# Patient Record
Sex: FEMALE | Race: WHITE
[De-identification: names, ages, dates, MRNs, and addresses within clinical notes are randomized per-mention and may not be internally consistent; named-entity substitution may affect disease eponyms.]

---

## 2019-04-05 ENCOUNTER — HOSPITAL ENCOUNTER (INPATIENT)
Dept: HOSPITAL 95 - ER | Age: 49
LOS: 2 days | Discharge: HOME | DRG: 392 | End: 2019-04-07
Attending: SURGERY | Admitting: SURGERY
Payer: COMMERCIAL

## 2019-04-05 VITALS — HEIGHT: 67.99 IN | WEIGHT: 134.92 LBS | BODY MASS INDEX: 20.45 KG/M2

## 2019-04-05 DIAGNOSIS — F41.9: ICD-10-CM

## 2019-04-05 DIAGNOSIS — K63.5: ICD-10-CM

## 2019-04-05 DIAGNOSIS — K21.9: ICD-10-CM

## 2019-04-05 DIAGNOSIS — K63.89: ICD-10-CM

## 2019-04-05 DIAGNOSIS — Z79.899: ICD-10-CM

## 2019-04-05 DIAGNOSIS — K64.8: ICD-10-CM

## 2019-04-05 DIAGNOSIS — K59.09: Primary | ICD-10-CM

## 2019-04-05 DIAGNOSIS — Z88.5: ICD-10-CM

## 2019-04-05 LAB
ALBUMIN SERPL BCP-MCNC: 3.8 G/DL (ref 3.4–5)
ALBUMIN/GLOB SERPL: 1.3 {RATIO} (ref 0.8–1.8)
ALT SERPL W P-5'-P-CCNC: 31 U/L (ref 12–78)
ANION GAP SERPL CALCULATED.4IONS-SCNC: 3 MMOL/L (ref 6–16)
AST SERPL W P-5'-P-CCNC: 25 U/L (ref 12–37)
BASOPHILS # BLD AUTO: 0.05 K/MM3 (ref 0–0.23)
BASOPHILS NFR BLD AUTO: 1 % (ref 0–2)
BILIRUB SERPL-MCNC: 0.4 MG/DL (ref 0.1–1)
BUN SERPL-MCNC: 11 MG/DL (ref 8–24)
CALCIUM SERPL-MCNC: 8.5 MG/DL (ref 8.5–10.1)
CHLORIDE SERPL-SCNC: 105 MMOL/L (ref 98–108)
CO2 SERPL-SCNC: 29 MMOL/L (ref 21–32)
CREAT SERPL-MCNC: 0.68 MG/DL (ref 0.4–1)
DEPRECATED RDW RBC AUTO: 41.8 FL (ref 35.1–46.3)
EOSINOPHIL # BLD AUTO: 0.07 K/MM3 (ref 0–0.68)
EOSINOPHIL NFR BLD AUTO: 1 % (ref 0–6)
ERYTHROCYTE [DISTWIDTH] IN BLOOD BY AUTOMATED COUNT: 12.2 % (ref 11.7–14.2)
GLOBULIN SER CALC-MCNC: 3 G/DL (ref 2.2–4)
GLUCOSE SERPL-MCNC: 99 MG/DL (ref 70–99)
HCT VFR BLD AUTO: 39.3 % (ref 33–51)
HGB BLD-MCNC: 13.2 G/DL (ref 11.5–16)
IMM GRANULOCYTES # BLD AUTO: 0.02 K/MM3 (ref 0–0.1)
IMM GRANULOCYTES NFR BLD AUTO: 0 % (ref 0–1)
LYMPHOCYTES # BLD AUTO: 3.09 K/MM3 (ref 0.84–5.2)
LYMPHOCYTES NFR BLD AUTO: 37 % (ref 21–46)
MCHC RBC AUTO-ENTMCNC: 33.6 G/DL (ref 31.5–36.5)
MCV RBC AUTO: 93 FL (ref 80–100)
MONOCYTES # BLD AUTO: 0.89 K/MM3 (ref 0.16–1.47)
MONOCYTES NFR BLD AUTO: 11 % (ref 4–13)
NEUTROPHILS # BLD AUTO: 4.18 K/MM3 (ref 1.96–9.15)
NEUTROPHILS NFR BLD AUTO: 51 % (ref 41–73)
NRBC # BLD AUTO: 0 K/MM3 (ref 0–0.02)
NRBC BLD AUTO-RTO: 0 /100 WBC (ref 0–0.2)
PLATELET # BLD AUTO: 242 K/MM3 (ref 150–400)
POTASSIUM SERPL-SCNC: 3.3 MMOL/L (ref 3.5–5.5)
PROT SERPL-MCNC: 6.8 G/DL (ref 6.4–8.2)
RBC #/AREA URNS HPF: (no result) /HPF (ref 0–2)
SODIUM SERPL-SCNC: 137 MMOL/L (ref 136–145)
SP GR SPEC: 1 (ref 1–1.02)
UROBILINOGEN UR STRIP-MCNC: (no result) MG/DL
WBC #/AREA URNS HPF: (no result) /HPF (ref 0–5)

## 2019-04-06 NOTE — NUR
SHIFT SUMMARY
PT HAS DENIED PAIN THIS SHIFT.  SHE HAS PASSED STOOL.  PT IS TOLERATING A
CLEAR LIQUID DIET.  PLAN FOR COLONOSCOPY TOMORROW WITH DR. ZULUAGA.  VSS. WILL
CONTINUE TO MONITOR.

## 2019-04-06 NOTE — NUR
SHIFT SUMMARY
NO ACUTE CHANGES SINCE ADMISSION TO THE FLOOR. RESP UNLABORED, VSS. PAIN
MANAGED WITH 25 MCG IV FENTANYL. MILD NAUSEA, NO VOMITING NOTED. PT IS
PASSING FLATUS, VOIDING WNL. NPO SINCE ADMISSION. FLUIDS INFUSING PER EMAR.
INDEPENDENT IN ROOM. CALL LIGHT IN REACH

## 2019-04-07 PROCEDURE — 0DBN8ZX EXCISION OF SIGMOID COLON, VIA NATURAL OR ARTIFICIAL OPENING ENDOSCOPIC, DIAGNOSTIC: ICD-10-PCS | Performed by: INTERNAL MEDICINE

## 2019-04-07 NOTE — NUR
SHIFT SUMMARY
PT ADMITTED FOR ABD PAIN. SCHEDULED FOR COLONSCOPY TODAY, COMPLETED PREP
OVERNIGHT WITH FAUSTO WESLEY MAINTENENCE FLUIDS INFUSING. PT HAS NOT HAD ANY
SHARP ABD PAIN SINCE HER BM FOLLOWING THE CT SCAN, HER ONLY COMPLAINTS OF PAIN
WAS SLIGHT CRAMPING RELATED TO THE FREQUENT BMS WITH THE BOWEL PREP. PT IS
A&O, ABLE TO MAKE NEEDS KNOWN. INDEP IN THE ROOM. WILL CTM UNTIL PASS TO NEXT
SHIFT.

## 2019-04-07 NOTE — NUR
04/07/19 1042 Rudi Luu
History, Chart, Medications and Allergies reviewed before start of
procedure.MONITOR INTACT WITH CONTINUOUS PULSE OXIMETRY AND
INTERMITTENT BP.3-LEAD EKG REVIEWED WITH PHYSICIAN PRIOR TO START OF
PROCEDURE.O2 VIA N/C INTACT THROUGHOUT SEDATION/PROCEDURE. Patient
confirms NPO status and agrees with scheduled surgery.PATIENT
DETERMINED TO BE ASA APPROPRIATE FOR PROPOFOL SEDATION PRIOR TO START
OF PROCEDURE BY DR. ZULUAGA.

## 2023-03-28 NOTE — NUR
PATIENT REPORTS HER STOOL IS CLEAR YELLOW.  NO NAUSEA REPORTED.  NPO FOR
COLONOSCOPY THIS AM.  MILD ABD TENDERNESS. Render Note In Bullet Format When Appropriate: No Render Post-Care Instructions In Note?: yes Detail Level: Detailed Consent: The patient's consent was obtained including but not limited to risks of crusting, scabbing, blistering, scarring, darker or lighter pigmentary change, recurrence, incomplete removal and infection. Duration Of Freeze Thaw-Cycle (Seconds): 0 Number Of Freeze-Thaw Cycles: 2 freeze-thaw cycles Post-Care Instructions: I reviewed with the patient in detail post-care instructions. Patient is to wear sunprotection, and avoid picking at any of the treated lesions. Pt may apply Vaseline to crusted or scabbing areas. Patient was given aquaphor and after care instructions. Medical Necessity Information: It is in your best interest to select a reason for this procedure from the list below. All of these items fulfill various CMS LCD requirements except the new and changing color options. Spray Paint Text: The liquid nitrogen was applied to the skin utilizing a spray paint frosting technique. Post-Care Instructions: I reviewed with the patient in detail post-instructions. Patient is to wear sunprotection, and avoid picking at any of the treated lesions. Pt may apply Vaseline to crusted or scabbing areas. Medical Necessity Clause: This procedure was medically necessary because the lesions that were treated were: Number Of Freeze-Thaw Cycles: 1 freeze-thaw cycle